# Patient Record
Sex: MALE | Race: WHITE | NOT HISPANIC OR LATINO | Employment: OTHER | URBAN - METROPOLITAN AREA
[De-identification: names, ages, dates, MRNs, and addresses within clinical notes are randomized per-mention and may not be internally consistent; named-entity substitution may affect disease eponyms.]

---

## 2017-08-21 DIAGNOSIS — C69.32 CHOROIDAL MALIGNANT MELANOMA, LEFT: Primary | ICD-10-CM

## 2017-10-11 ENCOUNTER — CLINICAL SUPPORT (OUTPATIENT)
Dept: OPHTHALMOLOGY | Facility: CLINIC | Age: 67
End: 2017-10-11
Attending: OPHTHALMOLOGY
Payer: MEDICARE

## 2017-10-11 DIAGNOSIS — C69.32 CHOROIDAL MALIGNANT MELANOMA, LEFT: ICD-10-CM

## 2017-10-11 PROCEDURE — 76512 OPH US DX B-SCAN: CPT | Mod: 26,50,S$PBB, | Performed by: OPHTHALMOLOGY

## 2017-10-11 PROCEDURE — 92285 EXTERNAL OCULAR PHOTOGRAPHY: CPT | Mod: 50,PBBFAC

## 2017-10-11 PROCEDURE — 92285 EXTERNAL OCULAR PHOTOGRAPHY: CPT | Mod: 26,S$PBB,, | Performed by: OPHTHALMOLOGY

## 2017-10-11 PROCEDURE — 76512 OPH US DX B-SCAN: CPT | Mod: 50,PBBFAC

## 2017-10-17 DIAGNOSIS — D31.31 CHOROIDAL NEVUS OF RIGHT EYE: Primary | ICD-10-CM

## 2018-01-19 DIAGNOSIS — D22.9 NEVUS: ICD-10-CM

## 2018-01-19 DIAGNOSIS — C43.9 MALIGNANT MELANOMA, UNSPECIFIED SITE: Primary | ICD-10-CM

## 2018-02-07 ENCOUNTER — CLINICAL SUPPORT (OUTPATIENT)
Dept: OPHTHALMOLOGY | Facility: CLINIC | Age: 68
End: 2018-02-07
Attending: OPHTHALMOLOGY
Payer: MEDICARE

## 2018-02-07 ENCOUNTER — CLINICAL SUPPORT (OUTPATIENT)
Dept: OPHTHALMOLOGY | Facility: CLINIC | Age: 68
End: 2018-02-07
Payer: MEDICARE

## 2018-02-07 DIAGNOSIS — C43.9 MALIGNANT MELANOMA, UNSPECIFIED SITE: ICD-10-CM

## 2018-02-07 DIAGNOSIS — D22.9 NEVUS: ICD-10-CM

## 2018-02-07 PROCEDURE — 92250 FUNDUS PHOTOGRAPHY W/I&R: CPT | Mod: 26,S$PBB,, | Performed by: OPHTHALMOLOGY

## 2018-02-07 PROCEDURE — 92250 FUNDUS PHOTOGRAPHY W/I&R: CPT | Mod: PBBFAC

## 2018-02-07 PROCEDURE — 76512 OPH US DX B-SCAN: CPT | Mod: PBBFAC,RT

## 2018-02-07 PROCEDURE — 76512 OPH US DX B-SCAN: CPT | Mod: 26,S$PBB,RT, | Performed by: OPHTHALMOLOGY

## 2018-02-07 PROCEDURE — 92285 EXTERNAL OCULAR PHOTOGRAPHY: CPT | Mod: 26,S$PBB,, | Performed by: OPHTHALMOLOGY

## 2018-02-07 PROCEDURE — 92285 EXTERNAL OCULAR PHOTOGRAPHY: CPT | Mod: PBBFAC

## 2019-08-15 ENCOUNTER — TELEPHONE (OUTPATIENT)
Dept: DERMATOLOGY | Facility: CLINIC | Age: 69
End: 2019-08-15

## 2019-08-15 NOTE — TELEPHONE ENCOUNTER
Contacted , he is a new pt who was referred over to Mohs by Dr. Shafer. He would like same day surgery/ consult. I explained to him the Mohs procedure and also that there would be a possible skin graft. He confirmed his appt for 9/10/2019.Will mail appt reminder and Mohs info packet.

## 2019-09-03 ENCOUNTER — TELEPHONE (OUTPATIENT)
Dept: DERMATOLOGY | Facility: CLINIC | Age: 69
End: 2019-09-03

## 2019-09-03 NOTE — TELEPHONE ENCOUNTER
----- Message from Kassandra Rosen sent at 9/3/2019  9:21 AM CDT -----  Contact: patient  [phone]-please call above patient at number in message need to speak with the nurse before surgery waiting on a call back thanks

## 2019-09-03 NOTE — TELEPHONE ENCOUNTER
Contacted , He was concerned about being put to sleep for this procedure but I explained to him that Dr. Horton does not put pt to sleep because everything is done under local anesthia. He stated that he understood but also wanted to know about another site he had and wanted to make sure that he needed to see his general derm to have this area bx first

## 2019-09-10 ENCOUNTER — PROCEDURE VISIT (OUTPATIENT)
Dept: DERMATOLOGY | Facility: CLINIC | Age: 69
End: 2019-09-10
Payer: MEDICARE

## 2019-09-10 VITALS
SYSTOLIC BLOOD PRESSURE: 129 MMHG | BODY MASS INDEX: 22.35 KG/M2 | HEART RATE: 70 BPM | WEIGHT: 165 LBS | DIASTOLIC BLOOD PRESSURE: 76 MMHG | HEIGHT: 72 IN

## 2019-09-10 DIAGNOSIS — C44.311 BASAL CELL CARCINOMA OF RIGHT ALA NASI: Primary | ICD-10-CM

## 2019-09-10 PROCEDURE — 99202 OFFICE O/P NEW SF 15 MIN: CPT | Mod: 57,S$PBB,, | Performed by: DERMATOLOGY

## 2019-09-10 PROCEDURE — 17312: ICD-10-PCS | Mod: S$PBB,,, | Performed by: DERMATOLOGY

## 2019-09-10 PROCEDURE — 17311: ICD-10-PCS | Mod: S$PBB,,, | Performed by: DERMATOLOGY

## 2019-09-10 PROCEDURE — 99202 PR OFFICE/OUTPT VISIT, NEW, LEVL II, 15-29 MIN: ICD-10-PCS | Mod: 57,S$PBB,, | Performed by: DERMATOLOGY

## 2019-09-10 PROCEDURE — 17312 MOHS ADDL STAGE: CPT | Mod: S$PBB,,, | Performed by: DERMATOLOGY

## 2019-09-10 PROCEDURE — 17311 MOHS 1 STAGE H/N/HF/G: CPT | Mod: S$PBB,,, | Performed by: DERMATOLOGY

## 2019-09-10 PROCEDURE — 17312 MOHS ADDL STAGE: CPT | Mod: PBBFAC | Performed by: DERMATOLOGY

## 2019-09-10 PROCEDURE — 17311 MOHS 1 STAGE H/N/HF/G: CPT | Mod: PBBFAC | Performed by: DERMATOLOGY

## 2019-09-10 NOTE — PROGRESS NOTES
PROCEDURE: Mohs' Micrographic Surgery    INDICATION: Location in mask areas of face including central face, nose, eyelids, eyebrows, lips, chin, preauricular, temple, and ear. Biopsy-proven skin cancer of cosmetically and functionally important areas, including head, neck, genital, hand, foot, or areas known for having difficulty in healing, such as the lower anterior legs. Tumor with ill-defined borders. In patient with proven history of difficult or aggressive skin cancer.    REFERRING MD: Priscilla Shafer M.D.    CASE NUMBER:     ANESTHETIC: 5 cc 0.5% Lidocaine with Epi 1:200,000 mixed 1:1 with 0.5% Bupivacaine    SURGICAL PREP: Hibiclens    SURGEON: Denis Horton MD    ASSISTANTS: Shauna Parikh PA-C and Marcelina Charles MA    PREOPERATIVE DIAGNOSIS: basal cell carcinoma    POSTOPERATIVE DIAGNOSIS: basal cell carcinoma    PATHOLOGIC DIAGNOSIS: basal cell carcinoma- nodular    HISTOLOGY OF SPECIMENS IN FIRST STAGE:   Tumor Type: Tumor seen. Nodular basal cell carcinoma: Nodular tumor in dermis composed of basaloid cells exhibiting peripheral palisading and retraction artifact.   Depth of Invasion: epidermis and dermis  Perineural Invasion: No    HISTOLOGY OF SPECIMENS IN SUBSEQUENT STAGES:  · Tumor Type: No tumor seen.    STAGES OF MOHS' SURGERY PERFORMED: 2    TUMOR-FREE PLANE ACHIEVED: Yes    HEMOSTASIS: electrocoagulation     SPECIMENS: 3 (2 in stage A and 1 in stage B)    LOCATION: right (inferolateral) nasal ala. Patient verified location with hand held mirror.    INITIAL LESION SIZE: 0.4 x 0.4 cm    FINAL DEFECT SIZE: 0.6 x 0.8 cm    WOUND REPAIR/DISPOSITION: When the tumor was completely removed, repair options were discussed with the patient, and it was decided to let the wound heal by second intention since lesion located in alar groove. The patient tolerated the procedure well and will consider delayed reconstruction or repair if necessary.    The area was cleaned and dressed appropriately, and the  patient was given wound care instructions, as well as an appointment for follow-up evaluation.    Vitals:    09/10/19 0737 09/10/19 0949   BP: 127/77 129/76   BP Location: Left arm    Patient Position: Sitting    BP Method: Medium (Automatic)    Pulse: 72 70   Weight: 74.8 kg (165 lb)    Height: 6' (1.829 m)          NOTE: Pt was not pleased with size of pressure bandage, explained we needed it to prevent postop bleeding given open wound and that he could change it after 2 days. Pt stated he did not want to come back in follow-up.  I called Dr. Shafer and explained this to her as well.

## 2019-09-10 NOTE — PROGRESS NOTES
REFERRING MD:  Priscilla Shafer M.D.    CHIEF COMPLAINT:  New patient being consulted for Mohs' surgery evaluation.    HISTORY OF PRESENT ILLNESS:  69 y.o. male presents with an unknown duration of growth on the R nasal ala. Lesion found on skin check. (+) h/o ocular melanoma.    Negative for scabbing.  Negative for crusting.  Negative for bleeding.  Negative for itching.    Biopsy consistent with basal cell carcinoma.     No prior treatment.    Pacemaker: No  Defibrillator: No  Artificial joints: No  Artificial heart valves: No    PAST MEDICAL HISTORY:  Past Medical History:   Diagnosis Date    Basal cell carcinoma     Colon cancer     Ocular melanoma        PAST SURGICAL HISTORY:  Past Surgical History:   Procedure Laterality Date    COLON SURGERY      COLONOSCOPY      eye radiation      EYE SURGERY          SOCIAL HISTORY:  Dependencies:  never smoked    PERTINENT MEDICATIONS:  See medications list.  aspirin and fish oil    ALLERGIES:  Patient has no known allergies.    ROS:  Skin: See HPI  Constitutional: No fatigue, fever, malaise, weight loss, or night sweats.  Cardiovascular: No chest pain, palpitations, or edema.  Respiratory: No coughing, wheezing, SOB, or sputum production.    Physical Exam   HENT:   Nose:             General: Mood and affect normal. Alert and orient X3. Normal appearance.  Eyelids:  no suspicious lesions  Head/Face: R superolateral nasal ala with a 4 x 4 mm bx site located 3 cm inferiorly from the right medial canthus and 1.2 cm superiorly from the right alar base.   Lips/Teeth/Gums:  no suspicious lesions     IMPRESSION:  Biopsy proven nodular basal cell carcinoma, R nasal ala, path# [id number].    PLAN:  The diagnosis and the pathology report were discussed in detail with the patient. Treatment options were reviewed, including Mohs Micrographic Surgery, radiation, topical therapy, and standard excision.  After careful review of patient's history and physical exam, and after  discussion of treatment options, the decision was made to perform Mohs micrographic surgery.    Risks, benefits, and alternatives of Mohs' surgery discussed with the patient. Discussed repair options including complex closure, skin flap, skin graft and second intention healing with the patient. Patient elected to proceed with Mohs surgery today.    Consulting report is sent to the consulting provider.

## 2022-05-03 ENCOUNTER — HOSPITAL ENCOUNTER (OUTPATIENT)
Dept: RADIOLOGY | Facility: HOSPITAL | Age: 72
Discharge: HOME | End: 2022-05-03
Attending: ORTHOPAEDIC SURGERY
Payer: MEDICARE

## 2022-05-03 ENCOUNTER — OFFICE VISIT (OUTPATIENT)
Dept: ORTHOPEDICS | Facility: CLINIC | Age: 72
End: 2022-05-03
Payer: MEDICARE

## 2022-05-03 VITALS — HEIGHT: 72 IN | BODY MASS INDEX: 22.33 KG/M2 | WEIGHT: 164.88 LBS

## 2022-05-03 DIAGNOSIS — M20.42 HAMMER TOES OF BOTH FEET: ICD-10-CM

## 2022-05-03 DIAGNOSIS — R52 PAIN: ICD-10-CM

## 2022-05-03 DIAGNOSIS — M20.41 HAMMER TOES OF BOTH FEET: ICD-10-CM

## 2022-05-03 DIAGNOSIS — M20.12 HALLUX VALGUS, BILATERAL: Primary | ICD-10-CM

## 2022-05-03 DIAGNOSIS — M20.11 HALLUX VALGUS, BILATERAL: Primary | ICD-10-CM

## 2022-05-03 PROCEDURE — 99999 PR PBB SHADOW E&M-EST. PATIENT-LVL III: CPT | Mod: PBBFAC,,, | Performed by: ORTHOPAEDIC SURGERY

## 2022-05-03 PROCEDURE — 99203 PR OFFICE/OUTPT VISIT, NEW, LEVL III, 30-44 MIN: ICD-10-PCS | Mod: S$PBB,,, | Performed by: ORTHOPAEDIC SURGERY

## 2022-05-03 PROCEDURE — 99213 OFFICE O/P EST LOW 20 MIN: CPT | Mod: PBBFAC | Performed by: ORTHOPAEDIC SURGERY

## 2022-05-03 PROCEDURE — 73630 X-RAY EXAM OF FOOT: CPT | Mod: 26,50,, | Performed by: RADIOLOGY

## 2022-05-03 PROCEDURE — 73630 X-RAY EXAM OF FOOT: CPT | Mod: TC,50

## 2022-05-03 PROCEDURE — 73630 XR FOOT COMPLETE 3 VIEW BILATERAL: ICD-10-PCS | Mod: 26,50,, | Performed by: RADIOLOGY

## 2022-05-03 PROCEDURE — 99203 OFFICE O/P NEW LOW 30 MIN: CPT | Mod: S$PBB,,, | Performed by: ORTHOPAEDIC SURGERY

## 2022-05-03 PROCEDURE — 99999 PR PBB SHADOW E&M-EST. PATIENT-LVL III: ICD-10-PCS | Mod: PBBFAC,,, | Performed by: ORTHOPAEDIC SURGERY

## 2022-05-03 NOTE — PROGRESS NOTES
CHIEF COMPLAINT:    Bilateral bunions                                                HISTORY OF PRESENT ILLNESS:  The patient is a 71 y.o. male  who presents for evaluation of bilateral bunions (L>R) and bilateral 2nd hammertoes. Pt reports bunions have been present since his 30's, but deformity and pain appear to have worsened over the past 3 years. Pt reports pain from the hammertoes to bottom of his 2nd toes and pain to medial left great toe. He has also noticed callous formation to medial MTP bilaterally. Pt reports pain is worst to his left foot with ambulation and at the end of the day, improved at rest. Pt has tried nsaids and tylenol for pain. He reports significant pain that is affecting his QoL. Pt was previously very active, but has had to decrease his activity level over the past several years. Pain is severe 6/10 with activity and patient has failed conservative management. He denies paresthesias to BLE.         PAST MEDICAL HISTORY:   Past Medical History:   Diagnosis Date    Basal cell carcinoma     Colon cancer     Ocular melanoma      PAST SURGICAL HISTORY:   Past Surgical History:   Procedure Laterality Date    COLON SURGERY      COLONOSCOPY      eye radiation      EYE SURGERY       FAMILY HISTORY:   Family History   Problem Relation Age of Onset    Cancer Mother     Aneurysm Mother     No Known Problems Father      SOCIAL HISTORY:   Social History     Socioeconomic History    Marital status: Single    Years of education: 12   Tobacco Use    Smoking status: Never Smoker    Smokeless tobacco: Never Used   Substance and Sexual Activity    Alcohol use: No    Drug use: No       MEDICATIONS:   Current Outpatient Medications:     aspirin (ECOTRIN) 81 MG EC tablet, Take 81 mg by mouth once daily., Disp: , Rfl:     clobetasol (TEMOVATE) 0.05 % cream, Apply topically 2 (two) times daily., Disp: , Rfl:     docusate sodium (COLACE) 100 MG capsule, Take 100 mg by mouth 2 (two) times daily.,  Disp: , Rfl:     doxycycline (VIBRA-TABS) 100 MG tablet, Take 100 mg by mouth once daily., Disp: , Rfl:     KRILL OIL ORAL, Take 1 capsule by mouth once daily., Disp: , Rfl:     multivitamin (THERAGRAN) per tablet, Take 1 tablet by mouth once daily., Disp: , Rfl:     pravastatin (PRAVACHOL) 20 MG tablet, Take 20 mg by mouth once daily., Disp: , Rfl:     tamsulosin (FLOMAX) 0.4 mg Cap, Take 0.4 mg by mouth once daily., Disp: , Rfl:   ALLERGIES: Review of patient's allergies indicates:  No Known Allergies    VITAL SIGNS: Ht 6' (1.829 m)   Wt 74.8 kg (164 lb 14.5 oz)   BMI 22.37 kg/m²      Review of Systems   Constitution: Negative for chills, fever, weakness and weight loss.   HENT: Negative for congestion.   Cardiovascular: Negative for chest pain and dyspnea on exertion.   Respiratory: Negative for cough and shortness of breath.   Hematologic/Lymphatic: Does not bruise/bleed easily.   Skin: Negative for rash and suspicious lesions.   Musculoskeletal: see HPI  Gastrointestinal: Negative for bowel incontinence, constipation,diarrhea, vomiting.   Genitourinary: Negative for bladder incontinence.   Neurological: Negative for numbness, paresthesias and sensory change.           PHYSICAL EXAMINATION    General:  The patient is alert and oriented x 3.  Mood is pleasant.  Observation of ears, eyes and nose reveal no gross abnormalities.  No labored breathing observed.    bilateral Foot and Ankle Exam    INSPECTION:      ALIGNMENT:  Gait:    Normal    Hindfoot  Normal    Scars:   None    Midfoot: Normal  Swelling:  None    Forefoot: Bilateral rigid hallux valgus, severe on left, moderate on right; bilatera hammer toe deformity to 2nd mtp, rigid  Color:   Normal      Atrophy:  None      Heel / Toe Walking: No difficulty     Callous formation medially over bilateral 1st MTP                         TENDERNESS:  lATERAL:    anterior:  Sinus tarsi:  None  Anteromedial joint line:  none  Syndesmosis:  none  Anterolateral  joint line:  none  ATFL:   none  Talonavicular:    none   CFL:   none  Anterior tibialis:   none  Anterolateral gutter: none  Extensor tendons:   none  Fibula:   none  Peroneal tendons: none  POSTERIOR:  Peroneal tubercle.  None  Medial/lateral achilles:  none       Medial/lateral achilles insertion: none  MEDIAL:      Deltoid:  none  CALCANEUS:  Malleolus:  none  Retrocalcaneal:   none  PTT:   none  Medial achilles:   none  Navicular:  none  Lateral achilles:   none       Calcaneal tuberosity:   none  FOOT:    Calcaneal cuboid  none  MT / MT heads:  TTP over 1st MTP head   Navicular   none   Medial cord origin PF:  none  Cuneiforms:   none   Web space:   none  Lisfranc    none   Tarsal tunnel:   none  Base of the fifth metatarsal  none  Tinels sign   neg        RANGE OF MOTION:  RIGHT/ LEFT   STRENGTH: (affected)  Ankle DF/PF:  15/45  15/45    Anterior tibialis: 5/5     Eversion/Inversion: 15/25 15/25  Posterior tibialis: 5/5   Midfoot ABD/ADD: 10/10 10/10  Gastroc-soleus: 5/5   First MTP DF/PF: 60/25 60/25  Peroneals:  5/5         EHL:   5/5   (* = pain)     FHL:   5/5         (* = pain)      SPECIAL TESTS:   ANKLE INSTABILITY: (*pain)    Anterior drawer:   Normal      (C-W contralateral side)     Inversion:   30°     Eversion  10°            Collective Instability: (Ant-post and varus-valgus)     Stable        PROVOCATIVE TESTING:    Forced DF/ER: No pain at syndesmosis.    Mid-leg squeeze  No pain at syndesmosis    Forced DF:  No pain anterior joint line.      Forced PF:  No pain posterior ankle.     Forced INV:  No pain lateral    Forced EV:  No pain medial     Kirbys sign: Normal ankle plantar flexion.     Resisted peroneal No subluxation or pain    1st-2nd MT toggle No pain at Lisfranc    MT-T torque  No pain at Lisfranc     NEUROLOGIC TESTING:  All dermatomes foot, ankle and leg have normal sensation light touch  Ankle Reflexes 2+, symmetric   Negative Babinski and No Clonus    VASCULAR:  2+ pulses  PT/DT with brisk capillary refill toes.        XRAYS:  Bilateral foot XR (AP, lateral,mortise)  were ordered and reviewed. Bilateral hallux valgus, severe on left, moderate on right.  No evidence of any fracture or dislocation.  The osseous structures appear well mineralized and well aligned. No mortise displacement.    ASSESSMENT:     1. Hallux valgus, bilateral     2. Hammer toes of both feet     3. Pain  X-Ray Foot Complete Bilateral          PLAN:  I have discussed the nature of this problem with the patient today. We discussed both surgical and non-surgical options. Pt has severe pain which has failed conservative management. Pt would like to proceed with surgery. Plan for right lapidus and right 2nd hammer toe correction on June 30. Pt will return to clinic for pre-op appt.    I have personally taken the history and examined this patient and agree with the residents note as stated above.  I explained to the patient that I cannot guarantee complete relief of pain and restoration of normal alignment.

## 2022-06-06 ENCOUNTER — TELEPHONE (OUTPATIENT)
Dept: ORTHOPEDICS | Facility: CLINIC | Age: 72
End: 2022-06-06
Payer: MEDICARE

## 2022-06-06 NOTE — TELEPHONE ENCOUNTER
I spoke with pt I explained to him that I will give him a call once I confirm his surgery date of 9/29 with . pt verbalized understanding.            ----- Message from Katia Bueno sent at 6/6/2022 10:15 AM CDT -----  Type: Patient Call Back    Who called: Self     What is the request in detail: patient says he is masha for surgery on 6/30 and would like to change the date . Please call     Can the clinic reply by [facility]? No     Would the patient rather a call back or a response via [facility]?  Call     Best call back number:.[phone] (home)

## 2022-08-02 ENCOUNTER — TELEPHONE (OUTPATIENT)
Dept: ORTHOPEDICS | Facility: CLINIC | Age: 72
End: 2022-08-02
Payer: MEDICARE

## 2022-08-02 NOTE — TELEPHONE ENCOUNTER
Rescheduled patients pre-op and post op visits. Called to let patient know that his pre-op appt is on the same day-at 9:30a instead of 10am and post op is the same day and time. Unable to leave a message-no voicemail available and patient portal not set up.